# Patient Record
Sex: MALE
[De-identification: names, ages, dates, MRNs, and addresses within clinical notes are randomized per-mention and may not be internally consistent; named-entity substitution may affect disease eponyms.]

---

## 2019-07-24 ENCOUNTER — APPOINTMENT (OUTPATIENT)
Dept: OTOLARYNGOLOGY | Facility: CLINIC | Age: 34
End: 2019-07-24
Payer: COMMERCIAL

## 2019-07-24 VITALS
SYSTOLIC BLOOD PRESSURE: 132 MMHG | WEIGHT: 190 LBS | HEIGHT: 68 IN | BODY MASS INDEX: 28.79 KG/M2 | DIASTOLIC BLOOD PRESSURE: 80 MMHG | HEART RATE: 70 BPM

## 2019-07-24 DIAGNOSIS — Z78.9 OTHER SPECIFIED HEALTH STATUS: ICD-10-CM

## 2019-07-24 DIAGNOSIS — F17.200 NICOTINE DEPENDENCE, UNSPECIFIED, UNCOMPLICATED: ICD-10-CM

## 2019-07-24 DIAGNOSIS — H90.3 SENSORINEURAL HEARING LOSS, BILATERAL: ICD-10-CM

## 2019-07-24 DIAGNOSIS — Z87.09 PERSONAL HISTORY OF OTHER DISEASES OF THE RESPIRATORY SYSTEM: ICD-10-CM

## 2019-07-24 PROBLEM — Z00.00 ENCOUNTER FOR PREVENTIVE HEALTH EXAMINATION: Status: ACTIVE | Noted: 2019-07-24

## 2019-07-24 PROCEDURE — 99243 OFF/OP CNSLTJ NEW/EST LOW 30: CPT | Mod: 25

## 2019-07-24 PROCEDURE — 92557 COMPREHENSIVE HEARING TEST: CPT

## 2019-07-24 PROCEDURE — 31575 DIAGNOSTIC LARYNGOSCOPY: CPT

## 2019-07-24 PROCEDURE — 92567 TYMPANOMETRY: CPT

## 2019-07-24 NOTE — ASSESSMENT
[FreeTextEntry1] : Left ear and temporalis pain of radiating to the left trapezius. I am suspicious for a TMJ etiology. I asked him to see his dentist. A written handout on TMJ precautions was given. He will follow up in 2-3 weeks and if he's not improved, we will consider further radiologic workup. I asked him to also try to give me a copy of the previous imaging study done in workup for headaches within the past few years.

## 2019-07-24 NOTE — HISTORY OF PRESENT ILLNESS
[de-identified] : Patient seen in consultation for Dr. Crespo. Patient reports onset of left otalgia radiating to the temporal area and down into the left trapezius muscle starting on July 5. No known history of bruxism, dental issues, or previous such episodes. He see his dentist frequently.  Denies otorrhea, tinnitus, or vertigo.  Patient has no previous otologic history or acoustic trauma.  \par Reports pain is worse in the mornings, but is present intermittently throughout the day as well. He underwent a migraine workup several years ago and may have had an imaging study at that point, but he is not sure\par

## 2019-07-24 NOTE — CONSULT LETTER
[Dear  ___] : Dear  [unfilled], [Consult Letter:] : I had the pleasure of evaluating your patient, [unfilled]. [Please see my note below.] : Please see my note below. [Sincerely,] : Sincerely, [FreeTextEntry3] : Corinne Horn

## 2019-08-07 ENCOUNTER — APPOINTMENT (OUTPATIENT)
Dept: OTOLARYNGOLOGY | Facility: CLINIC | Age: 34
End: 2019-08-07
Payer: COMMERCIAL

## 2019-08-07 DIAGNOSIS — M26.629 ARTHRALGIA OF TEMPOROMANDIBULAR JOINT,: ICD-10-CM

## 2019-08-07 DIAGNOSIS — H92.09 OTALGIA, UNSPECIFIED EAR: ICD-10-CM

## 2019-08-07 PROCEDURE — 99213 OFFICE O/P EST LOW 20 MIN: CPT

## 2019-08-07 NOTE — HISTORY OF PRESENT ILLNESS
[de-identified] : Patient last seen July 24, reported onset of left otalgia radiating to the temporal area and down into the left trapezius muscle starting on July 5. No known history of bruxism, dental issues, or previous such episodes. He see his dentist frequently.  Denies otorrhea, tinnitus, or vertigo.  Patient has no previous otologic history or acoustic trauma.  \par Reports pain is worse in the mornings, but is present intermittently throughout the day as well. He underwent a migraine workup several years ago But does not think he had an imaging study at that point.\par \par Endoscopy was normal as was his otologic exam. Audiogram was normal. Recommended TMJ precautions, and his dentist saw no sign of TMJ etiology. Now reports the pain is moving around a somewhat, still worse in the mornings mostly located in the left temple. He also notes pain down the left trapezius\par

## 2019-08-07 NOTE — ASSESSMENT
[FreeTextEntry1] : Left ear and temporalis pain of radiating to the left trapezius. I am still suspicious for a TMJ etiology. I asked him to see [facility].  WIll consider MRI vs CT.  Continue TMJ precautions.  We will consider further radiologic workup.

## 2019-08-07 NOTE — CONSULT LETTER
[Dear  ___] : Dear  [unfilled], [Please see my note below.] : Please see my note below. [Consult Letter:] : I had the pleasure of evaluating your patient, [unfilled]. [Sincerely,] : Sincerely, [FreeTextEntry3] : Corinne Horn

## 2020-12-21 PROBLEM — Z87.09 HISTORY OF SORE THROAT: Status: RESOLVED | Noted: 2019-07-24 | Resolved: 2020-12-21
